# Patient Record
Sex: FEMALE | Race: WHITE | NOT HISPANIC OR LATINO | Employment: OTHER | URBAN - METROPOLITAN AREA
[De-identification: names, ages, dates, MRNs, and addresses within clinical notes are randomized per-mention and may not be internally consistent; named-entity substitution may affect disease eponyms.]

---

## 2017-03-31 ENCOUNTER — OFFICE VISIT (OUTPATIENT)
Dept: CARDIOLOGY | Facility: CLINIC | Age: 57
End: 2017-03-31
Payer: COMMERCIAL

## 2017-03-31 VITALS
WEIGHT: 165.7 LBS | HEART RATE: 68 BPM | SYSTOLIC BLOOD PRESSURE: 124 MMHG | BODY MASS INDEX: 26.01 KG/M2 | DIASTOLIC BLOOD PRESSURE: 86 MMHG | HEIGHT: 67 IN

## 2017-03-31 DIAGNOSIS — E78.00 PURE HYPERCHOLESTEROLEMIA: ICD-10-CM

## 2017-03-31 DIAGNOSIS — E78.2 MIXED HYPERLIPIDEMIA: Primary | ICD-10-CM

## 2017-03-31 LAB
CHOLEST SERPL-MCNC: 236 MG/DL
HDLC SERPL-MCNC: 56 MG/DL
LDLC SERPL CALC-MCNC: 159 MG/DL
NONHDLC SERPL-MCNC: 180 MG/DL
TRIGL SERPL-MCNC: 107 MG/DL

## 2017-03-31 PROCEDURE — 99213 OFFICE O/P EST LOW 20 MIN: CPT | Performed by: INTERNAL MEDICINE

## 2017-03-31 PROCEDURE — 36415 COLL VENOUS BLD VENIPUNCTURE: CPT | Performed by: NURSE PRACTITIONER

## 2017-03-31 PROCEDURE — 80061 LIPID PANEL: CPT | Performed by: NURSE PRACTITIONER

## 2017-03-31 NOTE — MR AVS SNAPSHOT
"              After Visit Summary   3/31/2017    Gricelda Jackson    MRN: [id number]           Patient Information     Date Of Birth          1960        Visit Information        Provider Department      3/31/2017 8:45 AM Guerda Argueta,  [facility] [place]        Today's Diagnoses     Mixed hyperlipidemia    -  1       Follow-ups after your visit        Who to contact     If you have questions or need follow up information about today's clinic visit or your schedule please contact [facility] directly at [phone].  Normal or non-critical lab and imaging results will be communicated to you by KirkeWebhart, letter or phone within 4 business days after the clinic has received the results. If you do not hear from us within 7 days, please contact the clinic through KirkeWebhart or phone. If you have a critical or abnormal lab result, we will notify you by phone as soon as possible.  Submit refill requests through DoApp or call your pharmacy and they will forward the refill request to us. Please allow 3 business days for your refill to be completed.          Additional Information About Your Visit        MyChart Information     DoApp lets you send messages to your doctor, view your test results, renew your prescriptions, schedule appointments and more. To sign up, go to [email or web address] . Click on \"Log in\" on the left side of the screen, which will take you to the Welcome page. Then click on \"Sign up Now\" on the right side of the page.     You will be asked to enter the access code listed below, as well as some personal information. Please follow the directions to create your username and password.     Your access code is: [email or web address]  Expires: 2017  8:52 AM     Your access code will  in 90 days. If you need help or a new code, please call your [place] clinic or [phone].        Care EveryWhere ID     This is " "your Care EveryWhere ID. This could be used by other organizations to access your [place] medical records  [id number]        Your Vitals Were     Pulse Height BMI (Body Mass Index)             68 1.702 m (5' 7\") 25.95 kg/m2          Blood Pressure from Last 3 Encounters:   03/31/17 124/86   01/05/16 106/72   12/16/15 106/68    Weight from Last 3 Encounters:   03/31/17 75.2 kg (165 lb 11.2 oz)   12/16/15 72.4 kg (159 lb 9.6 oz)   10/03/14 75.7 kg (166 lb 12.8 oz)              Today, you had the following     No orders found for display       Primary Care Provider Office Phone # Fax #    Jillian Jimenez [phone]       [facility] [address]  [facility] [address]        Thank you!     Thank you for choosing [facility] [place]  for your care. Our goal is always to provide you with excellent care. Hearing back from our patients is one way we can continue to improve our services. Please take a few minutes to complete the written survey that you may receive in the mail after your visit with us. Thank you!             Your Updated Medication List - Protect others around you: Learn how to safely use, store and throw away your medicines at www.disposemymeds.org.          This list is accurate as of: 3/31/17  8:59 AM.  Always use your most recent med list.                   Brand Name Dispense Instructions for use    acyclovir 800 MG tablet    ZOVIRAX     Take 800 mg by mouth daily       aspirin 81 MG tablet      Take by mouth daily       IRON SUPPLEMENT PO      Take 30 mLs by mouth daily Liquid       PROBIOTIC DAILY PO      Take 1 tablet by mouth daily       UNABLE TO FIND      2 tablets daily as needed MEDICATION NAME: Sinatrol       VITAMIN D (CHOLECALCIFEROL) PO      Take 5,000 Units by mouth daily 5,000 to 10,000 Units daily         "

## 2017-03-31 NOTE — PROGRESS NOTES
HPI and Plan:   See dictation    No orders of the defined types were placed in this encounter.      Orders Placed This Encounter   Medications     aspirin 81 MG tablet     Sig: Take by mouth daily       Medications Discontinued During This Encounter   Medication Reason     estradiol (VIVELLE-DOT) 0.1 MG/24HR patch Stopped by Patient     GURUGASTON LOPEZ PO Stopped by Patient     UNABLE TO FIND Stopped by Patient         Encounter Diagnosis   Name Primary?     Mixed hyperlipidemia Yes       CURRENT MEDICATIONS:  Current Outpatient Prescriptions   Medication Sig Dispense Refill     aspirin 81 MG tablet Take by mouth daily       acyclovir (ZOVIRAX) 800 MG tablet Take 800 mg by mouth daily       Probiotic Product (PROBIOTIC DAILY PO) Take 1 tablet by mouth daily       VITAMIN D, CHOLECALCIFEROL, PO Take 5,000 Units by mouth daily 5,000 to 10,000 Units daily       UNABLE TO FIND 2 tablets daily as needed MEDICATION NAME: Sinatrol       Ferrous Sulfate (IRON SUPPLEMENT PO) Take 30 mLs by mouth daily Liquid         ALLERGIES     Allergies   Allergen Reactions     Celebrex [Celecoxib]      diarrhea     Tetracycline      nausea       PAST MEDICAL HISTORY:  Past Medical History:   Diagnosis Date     Arthritis      Cancer (H)     basal cell     Depression      Fibromyalgia      Herpes      Hyperlipidemia      Hypothyroidism      Irritable bowel syndrome      Other chronic pain      PONV (postoperative nausea and vomiting)      Sleep apnea     does not use cpap       PAST SURGICAL HISTORY:  Past Surgical History:   Procedure Laterality Date     COLONOSCOPY       HYSTERECTOMY       LAPAROSCOPIC CHOLECYSTECTOMY N/A 10/3/2014    Procedure: LAPAROSCOPIC CHOLECYSTECTOMY;  Surgeon: Lucas Cruz MD;  Location: [facility]     ORTHOPEDIC SURGERY      plantars neuroma     SOFT TISSUE SURGERY      basal cell cancer removed back     wisdom teeth extraction         FAMILY HISTORY:  Family History   Problem Relation Age of Onset      TERESITA Father      Cardiovascular Father      Emphysema Father      Heart Failure Father      Hyperlipidemia Father      Hypertension Father      Myocardial Infarction Father      Unknown/Adopted Brother      Hypertension Mother      Hyperlipidemia Mother      Cardiovascular Maternal Grandmother      Cardiovascular Maternal Grandfather      Cancer - colorectal Paternal Grandfather        SOCIAL HISTORY:  Social History     Social History     Marital status: Single     Spouse name: N/A     Number of children: N/A     Years of education: N/A     Social History Main Topics     Smoking status: Former Smoker     Quit date: 1/1/1982     Smokeless tobacco: Never Used      Comment: smoked 4 years, off and on     Alcohol use 0.0 oz/week     0 Standard drinks or equivalent per week      Comment: 0-3 per week     Drug use: No     Sexual activity: Not Asked     Other Topics Concern     Blood Transfusions No     Caffeine Concern Yes     one cup tea per day     Sleep Concern No     Stress Concern No     Weight Concern Yes     weight loss 30# over last year     Special Diet Yes     veggies, fruit, salmon, whole grain bread     Exercise No     Social History Narrative       Review of Systems:  Skin:  Positive for rash     Eyes:  Positive for glasses;contacts    ENT:  Negative      Respiratory:  Positive for sleep apnea     Cardiovascular:  Negative;palpitations;chest pain;dizziness;lightheadedness;cyanosis;syncope or near-syncope;fatigue;edema;exercise intolerance fatigue;Positive for    Gastroenterology: Positive for constipation;diarrhea    Genitourinary:  Positive for nocturia    Musculoskeletal:  Positive for fibromyalgia;arthritis;back pain    Neurologic:  Negative      Psychiatric:  Positive for sleep disturbances    Heme/Lymph/Imm:  Positive for night sweats;allergies;anemia    Endocrine:  Positive for hot flashes;night sweats      Physical Exam:  Vitals: /86 (BP Location: Left arm, Cuff Size: Adult Large)  Pulse 68   "Ht 1.702 m (5' 7\")  Wt 75.2 kg (165 lb 11.2 oz)  BMI 25.95 kg/m2    Constitutional:  cooperative, alert and oriented, well developed, well nourished, in no acute distress        Skin:  warm and dry to the touch        Head:  normocephalic        Eyes:  pupils equal and round        ENT:  no pallor or cyanosis        Neck:           Chest:             Cardiac:                    Abdomen:           Vascular:                                          Extremities and Back:  no deformities, clubbing, cyanosis, erythema observed              Neurological:  affect appropriate, oriented to time, person and place;no gross motor deficits              CC  No referring provider defined for this encounter.                  "

## 2017-03-31 NOTE — LETTER
3/31/2017    Jillian Jimenez MD  [facility]   [address]  [facility] [address]    RE: Gricelda Jackson       Dear Colleague,    I had the pleasure of seeing Gricelda Jackson in the [facility].    REFERRING PHYSICIAN:  Dr. Jillian Jimenez.      Ms. Jackson is a very pleasant 57-year-old female with a history of hyperlipidemia and family history of coronary artery disease.  She is here for a followup visit today to go over cholesterol numbers which she had drawn today.  She had a calcium scoring a couple of years ago that showed 0 calcium scoring low risk for future cardiovascular events.  I have been trying to treat her cholesterol with lifestyle modifications.  Today, her cholesterol is down only modestly, total is 236, HDL 56, LDL has dropped about 6 points to 159, triglycerides remain normal at 107.  We reviewed these numbers today.  She feels like she has really made efforts with her diet, but does admit that she could improve upon her exercise in regards to the lifestyle modifications.      PHYSICAL EXAMINATION:  Her blood pressure today was a little higher than what her normal is, but still normotensive at 124/86 and pulse is 68.  Her weight is 165 pounds.  This is up about 6 pounds from the beginning of this year.  Body mass index is 26.  Physical exam findings were otherwise unchanged.     Outpatient Encounter Prescriptions as of 3/31/2017   Medication Sig Dispense Refill     aspirin 81 MG tablet Take by mouth daily       acyclovir (ZOVIRAX) 800 MG tablet Take 800 mg by mouth daily       Probiotic Product (PROBIOTIC DAILY PO) Take 1 tablet by mouth daily       VITAMIN D, CHOLECALCIFEROL, PO Take 5,000 Units by mouth daily 5,000 to 10,000 Units daily       UNABLE TO FIND 2 tablets daily as needed MEDICATION NAME: Sinatrol       Ferrous Sulfate (IRON SUPPLEMENT PO) Take 30 mLs by mouth daily Liquid       [DISCONTINUED] estradiol (VIVELLE-DOT) 0.1 MG/24HR patch  Place 1 patch onto the skin twice a week       [DISCONTINUED] RHODIOLA ROSEA PO Take by mouth daily       [DISCONTINUED] UNABLE TO FIND Supplements: Digest Gold, HCL Pepsin, daily prn       No facility-administered encounter medications on file as of 3/31/2017.       SUMMARY:  Ms. Jackson is a very pleasant 57-year-old female with a history of hyperlipidemia and family history of coronary disease.  Her cholesterol numbers are modestly better from last year.  She has tried to make significant improvements in her diet.  She does admit that she could improve upon daily exercise and weight reduction as well.  I do not feel there is any indication to start medication at this time for her and we talked about the obstacles that she has that may keep her from making these important lifestyle modifications to reduce her risk for future cardiovascular events.  I will refer her back to her primary care provider at this time with continued work on lifestyle modifications and recommend annual cholesterol profile.  If her LDL does rise consistently above 160, that may be an indication to consider starting statin therapy.        Please feel free to contact me with any questions you have in regards to her care.     Again, thank you for allowing me to participate in the care of your patient.      Sincerely,    Guerda Argueta, DO     [facility]

## 2017-03-31 NOTE — PROGRESS NOTES
REFERRING PHYSICIAN:  Dr. Jillian Jimenez.      HISTORY OF PRESENT ILLNESS:  Ms. Jackson is a very pleasant 57-year-old female with a history of hyperlipidemia and family history of coronary artery disease.  She is here for a followup visit today to go over cholesterol numbers which she had drawn today.  She had a calcium scoring a couple of years ago that showed 0 calcium scoring low risk for future cardiovascular events.  I have been trying to treat her cholesterol with lifestyle modifications.  Today, her cholesterol is down only modestly, total is 236, HDL 56, LDL has dropped about 6 points to 159, triglycerides remain normal at 107.  We reviewed these numbers today.  She feels like she has really made efforts with her diet, but does admit that she could improve upon her exercise in regards to the lifestyle modifications.      PHYSICAL EXAMINATION:  Her blood pressure today was a little higher than what her normal is, but still normotensive at 124/86 and pulse is 68.  Her weight is 165 pounds.  This is up about 6 pounds from the beginning of this year.  Body mass index is 26.  Physical exam findings were otherwise unchanged.      SUMMARY:  Ms. Jackson is a very pleasant 57-year-old female with a history of hyperlipidemia and family history of coronary disease.  Her cholesterol numbers are modestly better from last year.  She has tried to make significant improvements in her diet.  She does admit that she could improve upon daily exercise and weight reduction as well.  I do not feel there is any indication to start medication at this time for her and we talked about the obstacles that she has that may keep her from making these important lifestyle modifications to reduce her risk for future cardiovascular events.  I will refer her back to her primary care provider at this time with continued work on lifestyle modifications and recommend annual cholesterol profile.  If her LDL does rise consistently above 160, that  may be an indication to consider starting statin therapy.        Please feel free to contact me with any questions you have in regards to her care.      cc:   Jillian Jimenez MD   OB[facility]N and Infertility    [address] [facility] [address] [place] [address]         MANDI ALTMAN DO             D: 2017 08:58   T: 2017 11:19   MT: AVA      Name:     MADONNA BE   MRN:      [id number]        Account:      [id number]   :      1960           Service Date: 2017      Document: [id number]

## 2017-06-10 ENCOUNTER — HEALTH MAINTENANCE LETTER (OUTPATIENT)
Age: 57
End: 2017-06-10

## 2019-12-19 ENCOUNTER — TRANSFERRED RECORDS (OUTPATIENT)
Dept: HEALTH INFORMATION MANAGEMENT | Facility: CLINIC | Age: 59
End: 2019-12-19

## 2019-12-23 ENCOUNTER — TRANSFERRED RECORDS (OUTPATIENT)
Dept: HEALTH INFORMATION MANAGEMENT | Facility: CLINIC | Age: 59
End: 2019-12-23

## 2019-12-30 ENCOUNTER — MEDICAL CORRESPONDENCE (OUTPATIENT)
Dept: HEALTH INFORMATION MANAGEMENT | Facility: CLINIC | Age: 59
End: 2019-12-30

## 2020-01-03 ENCOUNTER — DOCUMENTATION ONLY (OUTPATIENT)
Dept: CARDIOLOGY | Facility: CLINIC | Age: 60
End: 2020-01-03

## 2023-04-17 DIAGNOSIS — Z01.818 PREOP TESTING: Primary | ICD-10-CM

## 2023-04-18 DIAGNOSIS — Z01.818 PREOP TESTING: Primary | ICD-10-CM

## 2023-04-18 DIAGNOSIS — Z01.818 PREOP TESTING: ICD-10-CM

## 2023-04-18 LAB
ATRIAL RATE - MUSE: 64 BPM
DIASTOLIC BLOOD PRESSURE - MUSE: NORMAL MMHG
INTERPRETATION ECG - MUSE: NORMAL
P AXIS - MUSE: 65 DEGREES
PR INTERVAL - MUSE: 164 MS
QRS DURATION - MUSE: 90 MS
QT - MUSE: 412 MS
QTC - MUSE: 425 MS
R AXIS - MUSE: -51 DEGREES
SYSTOLIC BLOOD PRESSURE - MUSE: NORMAL MMHG
T AXIS - MUSE: 63 DEGREES
VENTRICULAR RATE- MUSE: 64 BPM

## 2023-04-18 PROCEDURE — 93010 ELECTROCARDIOGRAM REPORT: CPT | Performed by: INTERNAL MEDICINE

## 2023-06-01 ENCOUNTER — HEALTH MAINTENANCE LETTER (OUTPATIENT)
Age: 63
End: 2023-06-01

## 2024-08-10 ENCOUNTER — HEALTH MAINTENANCE LETTER (OUTPATIENT)
Age: 64
End: 2024-08-10

## 2025-06-14 ENCOUNTER — HEALTH MAINTENANCE LETTER (OUTPATIENT)
Age: 65
End: 2025-06-14

## 2025-08-16 ENCOUNTER — HEALTH MAINTENANCE LETTER (OUTPATIENT)
Age: 65
End: 2025-08-16